# Patient Record
Sex: FEMALE | Race: WHITE | Employment: PART TIME | URBAN - METROPOLITAN AREA
[De-identification: names, ages, dates, MRNs, and addresses within clinical notes are randomized per-mention and may not be internally consistent; named-entity substitution may affect disease eponyms.]

---

## 2020-12-02 ENCOUNTER — HOSPITAL ENCOUNTER (OUTPATIENT)
Age: 30
Discharge: HOME | End: 2020-12-02

## 2020-12-04 LAB — VZV IGG SER QL IA: 2.9

## 2021-03-14 ENCOUNTER — APPOINTMENT (OUTPATIENT)
Dept: GENERAL RADIOLOGY | Age: 31
End: 2021-03-14
Payer: COMMERCIAL

## 2021-03-14 ENCOUNTER — HOSPITAL ENCOUNTER (EMERGENCY)
Age: 31
Discharge: HOME | End: 2021-03-14
Attending: EMERGENCY MEDICINE
Payer: COMMERCIAL

## 2021-03-14 VITALS
SYSTOLIC BLOOD PRESSURE: 122 MMHG | OXYGEN SATURATION: 100 % | BODY MASS INDEX: 26 KG/M2 | DIASTOLIC BLOOD PRESSURE: 81 MMHG | WEIGHT: 129 LBS | HEIGHT: 59 IN | RESPIRATION RATE: 12 BRPM | HEART RATE: 92 BPM

## 2021-03-14 DIAGNOSIS — S93.411A SPRAIN OF CALCANEOFIBULAR LIGAMENT OF RIGHT ANKLE, INITIAL ENCOUNTER: Primary | ICD-10-CM

## 2021-03-14 PROCEDURE — 6370000000 HC RX 637 (ALT 250 FOR IP): Performed by: EMERGENCY MEDICINE

## 2021-03-14 PROCEDURE — 73610 X-RAY EXAM OF ANKLE: CPT

## 2021-03-14 PROCEDURE — 99282 EMERGENCY DEPT VISIT SF MDM: CPT

## 2021-03-14 RX ORDER — IBUPROFEN 200 MG
400 TABLET ORAL ONCE
Status: COMPLETED | OUTPATIENT
Start: 2021-03-14 | End: 2021-03-14

## 2021-03-14 RX ORDER — IBUPROFEN 400 MG/1
400 TABLET ORAL EVERY 6 HOURS PRN
Qty: 20 TABLET | Refills: 0 | Status: SHIPPED | OUTPATIENT
Start: 2021-03-14

## 2021-03-14 RX ADMIN — IBUPROFEN 400 MG: 200 TABLET, FILM COATED ORAL at 20:29

## 2021-03-14 ASSESSMENT — ENCOUNTER SYMPTOMS
NAUSEA: 0
SHORTNESS OF BREATH: 0
CONSTIPATION: 0
ABDOMINAL PAIN: 0
SINUS PAIN: 0
CHEST TIGHTNESS: 0
BACK PAIN: 0
DIARRHEA: 0
EYE PAIN: 0
SINUS PRESSURE: 0
COUGH: 0

## 2021-03-14 NOTE — ED TRIAGE NOTES
Pt comes to the ED via lobby with a right ankle injury while exercising. NO obvious deformity noted.

## 2021-03-15 NOTE — ED PROVIDER NOTES
[facility] ENCOUNTER          Pt Name: Evelin Gallardo  MRN: [id number]  [place] 1990  Date of evaluation: 3/14/2021  Physician: Irasema Tavarez MD, Joselin Guillen [place]    CHIEF COMPLAINT       Chief Complaint   Patient presents with    Ankle Pain     History obtained from chart review and the patient. HISTORY OF PRESENT ILLNESS    HPI  Barbie Huang is a 35 y.o. female who presents to the emergency department for evaluation of right ankle pain. Patient states about 2 days ago twisted her ankle on inversion while getting off a step, states she felt a pop at that time and after that pain remained mild. Yesterday decided to exercise splinting and the pain worsened. The patient has no other acute complaints at this time. REVIEW OF SYSTEMS   Review of Systems   Constitutional: Negative for chills, fever and unexpected weight change. HENT: Negative for congestion, ear pain, nosebleeds, sinus pressure and sinus pain. Eyes: Negative for pain. Respiratory: Negative for cough, chest tightness and shortness of breath. Cardiovascular: Negative for chest pain. Gastrointestinal: Negative for abdominal pain, constipation, diarrhea and nausea. Endocrine: Negative for polyuria. Genitourinary: Negative for dysuria and flank pain. Musculoskeletal: Negative for arthralgias, back pain, myalgias and neck pain. Skin: Negative for rash. Neurological: Negative for weakness and headaches. All other systems reviewed and are negative. PAST MEDICAL AND SURGICAL HISTORY   No past medical history on file. No past surgical history on file. MEDICATIONS   No current facility-administered medications for this encounter.      Current Outpatient Medications:     ibuprofen (IBU) 400 MG tablet, Take 1 tablet by mouth every 6 hours as needed for Pain, Disp: 20 tablet, Rfl: 0      SOCIAL HISTORY     Social History     Social History Narrative    Not on file     Social History     Tobacco Use    Smoking status: Not on file   Substance Use Topics    Alcohol use: Not on file    Drug use: Not on file         ALLERGIES   No Known Allergies      FAMILY HISTORY   No family history on file. PREVIOUS RECORDS   Previous records reviewed: This is this patient's first visit to [facility] ED, no previous records available on EMR. .        PHYSICAL EXAM     ED Triage Vitals [03/14/21 1951]   BP Temp Temp src Pulse Resp SpO2 Height Weight   122/81 -- -- 92 12 100 % 4' 11\" (1.499 m) 129 lb (58.5 kg)     Initial vital signs and nursing assessment reviewed and normal. Body mass index is 26.05 kg/m². Pulsoximetry is normal per my interpretation. Additional Vital Signs:  Vitals:    03/14/21 1951   BP: 122/81   Pulse: 92   Resp: 12   SpO2: 100%       Physical Exam  Vitals signs and nursing note reviewed. Constitutional:       General: She is not in acute distress. Appearance: Normal appearance. She is well-developed. HENT:      Head: Normocephalic and atraumatic. Right Ear: External ear normal.      Left Ear: External ear normal.      Nose: Nose normal.   Eyes:      Conjunctiva/sclera: Conjunctivae normal.      Pupils: Pupils are equal, round, and reactive to light. Neck:      Vascular: No JVD. Musculoskeletal:      Right ankle: She exhibits swelling and ecchymosis. She exhibits normal range of motion and no deformity. Tenderness. CF ligament and posterior TFL tenderness found. No lateral malleolus, no medial malleolus, no AITFL, no head of 5th metatarsal and no proximal fibula tenderness found. Achilles tendon exhibits no pain, no defect and normal Ceballos's test results. Feet:    Skin:     General: Skin is warm and dry. Neurological:      Mental Status: She is alert and oriented to person, place, and time. MEDICAL DECISION MAKING   Initial Assessment:   1.  Ankle sprain, rule out fracture  Plan:    Analgesia if needed   X-ray ordered by nursing team by protocol on arrival        ED RESULTS   Laboratory results:  Labs Reviewed - No data to display    Radiologic studies results:  XR ANKLE RIGHT (MIN 3 VIEWS)   Final Result   Soft tissue swelling. No fracture. **This report has been created using voice recognition software. It may contain minor errors which are inherent in voice recognition technology. **      Final report electronically signed by Dr. Severiano Pillar on 3/14/2021 8:09 PM                ED COURSE   ED Medications administered this visit:   Medications   ibuprofen (ADVIL;MOTRIN) tablet 400 mg (400 mg Oral Given 3/14/21 2029)          Strict return precautions and follow up instructions were discussed with the patient prior to discharge, with which the patient agrees. MEDICATION CHANGES     New Prescriptions    IBUPROFEN (IBU) 400 MG TABLET    Take 1 tablet by mouth every 6 hours as needed for Pain         FINAL DISPOSITION     Final diagnoses:   Sprain of calcaneofibular ligament of right ankle, initial encounter     Condition: condition: good  Dispo: Discharge to home      This transcription was electronically signed. It was dictated by use of voice recognition software and electronically transcribed. The transcription may contain errors not detected in proofreading.         Tejinder Gloria MD  03/14/21 2039